# Patient Record
Sex: FEMALE | Race: WHITE | NOT HISPANIC OR LATINO | Employment: PART TIME | URBAN - METROPOLITAN AREA
[De-identification: names, ages, dates, MRNs, and addresses within clinical notes are randomized per-mention and may not be internally consistent; named-entity substitution may affect disease eponyms.]

---

## 2019-12-06 ENCOUNTER — OFFICE VISIT (OUTPATIENT)
Dept: SURGERY | Facility: CLINIC | Age: 59
End: 2019-12-06

## 2019-12-06 VITALS — HEIGHT: 67 IN | OXYGEN SATURATION: 97 % | HEART RATE: 92 BPM | WEIGHT: 156.8 LBS | BODY MASS INDEX: 24.61 KG/M2

## 2019-12-06 DIAGNOSIS — R22.9 SUBCUTANEOUS NODULES: Primary | ICD-10-CM

## 2019-12-06 PROCEDURE — 99203 OFFICE O/P NEW LOW 30 MIN: CPT | Performed by: SURGERY

## 2019-12-06 RX ORDER — VENLAFAXINE HYDROCHLORIDE 150 MG/1
150 CAPSULE, EXTENDED RELEASE ORAL DAILY
COMMUNITY
Start: 2019-10-17

## 2019-12-06 RX ORDER — HYDROCODONE BITARTRATE AND ACETAMINOPHEN 10; 325 MG/1; MG/1
1 TABLET ORAL EVERY 6 HOURS PRN
COMMUNITY
Start: 2019-11-19

## 2019-12-13 ENCOUNTER — HOSPITAL ENCOUNTER (OUTPATIENT)
Dept: ULTRASOUND IMAGING | Facility: HOSPITAL | Age: 59
Discharge: HOME | End: 2019-12-13
Admitting: SURGERY

## 2019-12-13 DIAGNOSIS — R22.9 SUBCUTANEOUS NODULES: ICD-10-CM

## 2019-12-13 PROCEDURE — 76882 US LMTD JT/FCL EVL NVASC XTR: CPT

## 2019-12-18 NOTE — PROGRESS NOTES
General Surgery  Initial Office Visit    CC: Left upper arm masses    HPI: The patient is a pleasant 59 y.o. year-old lady who presents today for evaluation of multiple subcutaneous masses located within her left upper inner arm that measures only a few millimeters in diameter and have been present in a constant duration for the last 1-1/2 years.  The lesions are nontender and are located in the cluster with about 10 palpable nodular areas in the upper medial arm.  She was not sure if this was a possible lipoma as she has a chronic left flank lipoma that is been present for many years and is nontender with no recent growth.  It measures about 2 cm in diameter.  She does not recall any history of trauma to the upper arm and has had no subcu injections there.  She was referred to see me by Joselyn Jolley with [facility] after she had an actinic keratosis removed from the right lower extremity and she mentioned the palpable subcutaneous masses of the arm.  He has had no work-up/imaging of these lesions.    Past Medical History:   Hypertension  Anxiety  Depression  Rheumatoid arthritis  History of colon polyps    Past Surgical History:   Colonoscopy (possibly 2016 at [facility])    Medications:   Venlafaxine 150 mg daily  Metoprolol 25 mg half tablet twice daily  Prenatal vitamin once daily  Norco 10/325 mg as needed for pain    Allergies: No known drug allergies    Family History: Mother with breast cancer, sister with a bleeding disorder    Social History: , works as a  at [employer], smokes 1 pack/day cigarettes for the last 40 years, drinks 6 beers daily on average    ROS:   Constitutional: Negative for fevers or chills  HENT: Negative for hearing loss or runny nose  Eyes: Negative for vision changes or scleral icterus  Respiratory: Negative for cough or shortness of breath  Cardiovascular: Negative for chest pain or heart palpitations  Gastrointestinal: Negative for  abdominal pain, nausea, vomiting, constipation, melena, or hematochezia  Genitourinary: Negative for hematuria or dysuria  Musculoskeletal: Negative for joint swelling or gait instability  Neurologic: Negative for tremors or seizures  Psychiatric: Negative for suicidal ideations or depression  All other systems reviewed and negative    Physical Exam:  Vitals:    12/06/19 0933   Pulse: 92   SpO2: 97%     Height: 168 cm  Weight: 71 kg  BMI: 24  General: No acute distress, well-nourished & well-developed  HEAD: normocephalic, atraumatic  EYES: normal conjunctiva, sclera anicteric  EARS: grossly normal hearing  NECK: supple, no thyromegaly  CARDIOVASCULAR: regular rate and rhythm  RESPIRATORY: clear to auscultation bilaterally  GASTROINTESTINAL: soft, nontender, non-distended  MUSCULOSKELETAL: normal gait and station, at least 10 palpable subcutaneous nodules of the left upper arm medially that are nontender, somewhat mobile, and have no fluctuance or overlying skin erythema  PSYCHIATRIC: oriented x3, normal mood and affect    IMAGING:  None    ASSESSMENT & PLAN  Ms. Bourne is a 59-year-old lady with multiple subcutaneous nodules of the left upper arm concerning for either fat necrosis or multifocal lipomas.  I have recommended we check an ultrasound of the left upper arm to get a better look at the subcutaneous tissues and I will call her with the results as well as any further recommendations that may follow.    Alyssa Medina MD  General and Endoscopic Surgery  [facility]    [address], [address]  [place], [place], [address]  P: [phone]  F: [phone]

## 2019-12-27 ENCOUNTER — TELEPHONE (OUTPATIENT)
Dept: SURGERY | Facility: CLINIC | Age: 59
End: 2019-12-27

## 2019-12-27 NOTE — TELEPHONE ENCOUNTER
Please let her know that the ultrasound showed 5 separate subcutaneous masses that measure only a few millimeters in diameter each.  I am unsure what these masses represent, but they are likely benign.  I would be happy to remove them in the office under local anesthesia if she wishes.  Please schedule her for a 30-minute in office procedure appointment if she wants to have them removed.

## 2019-12-27 NOTE — TELEPHONE ENCOUNTER
Spoke w/ pt she is going to wait at this moment to remove, told her to call back if they start to bother her